# Patient Record
Sex: MALE | Race: BLACK OR AFRICAN AMERICAN | Employment: OTHER | URBAN - METROPOLITAN AREA
[De-identification: names, ages, dates, MRNs, and addresses within clinical notes are randomized per-mention and may not be internally consistent; named-entity substitution may affect disease eponyms.]

---

## 2022-04-27 ENCOUNTER — HOSPITAL ENCOUNTER (OUTPATIENT)
Age: 72
Setting detail: OUTPATIENT SURGERY
Discharge: HOME | End: 2022-04-27
Attending: INTERNAL MEDICINE | Admitting: INTERNAL MEDICINE
Payer: MEDICARE

## 2022-04-27 VITALS
HEIGHT: 74 IN | OXYGEN SATURATION: 94 % | RESPIRATION RATE: 16 BRPM | SYSTOLIC BLOOD PRESSURE: 161 MMHG | BODY MASS INDEX: 36.72 KG/M2 | DIASTOLIC BLOOD PRESSURE: 98 MMHG | HEART RATE: 71 BPM | WEIGHT: 286.1 LBS | TEMPERATURE: 97.8 F

## 2022-04-27 LAB — GLUCOSE BLD STRIP.AUTO-MCNC: 155 MG/DL (ref 70–110)

## 2022-04-27 PROCEDURE — 77030040361 HC SLV COMPR DVT MDII -B: Performed by: INTERNAL MEDICINE

## 2022-04-27 PROCEDURE — 77030039961 HC KT CUST COLON BSC -D: Performed by: INTERNAL MEDICINE

## 2022-04-27 PROCEDURE — 2709999900 HC NON-CHARGEABLE SUPPLY: Performed by: INTERNAL MEDICINE

## 2022-04-27 PROCEDURE — 82962 GLUCOSE BLOOD TEST: CPT

## 2022-04-27 PROCEDURE — 74011250636 HC RX REV CODE- 250/636: Performed by: INTERNAL MEDICINE

## 2022-04-27 PROCEDURE — 76040000019: Performed by: INTERNAL MEDICINE

## 2022-04-27 RX ORDER — EPINEPHRINE 0.1 MG/ML
1 INJECTION INTRACARDIAC; INTRAVENOUS
Status: CANCELLED | OUTPATIENT
Start: 2022-04-27 | End: 2022-04-28

## 2022-04-27 RX ORDER — SODIUM CHLORIDE 9 MG/ML
125 INJECTION, SOLUTION INTRAVENOUS CONTINUOUS
Status: DISCONTINUED | OUTPATIENT
Start: 2022-04-27 | End: 2022-04-27 | Stop reason: HOSPADM

## 2022-04-27 RX ORDER — METFORMIN HYDROCHLORIDE 500 MG/1
500 TABLET ORAL 2 TIMES DAILY
COMMUNITY

## 2022-04-27 RX ORDER — DIPHENHYDRAMINE HYDROCHLORIDE 50 MG/ML
50 INJECTION, SOLUTION INTRAMUSCULAR; INTRAVENOUS ONCE
Status: CANCELLED | OUTPATIENT
Start: 2022-04-27 | End: 2022-04-27

## 2022-04-27 RX ORDER — SODIUM CHLORIDE 9 MG/ML
1000 INJECTION, SOLUTION INTRAVENOUS CONTINUOUS
Status: CANCELLED | OUTPATIENT
Start: 2022-04-27 | End: 2022-04-27

## 2022-04-27 RX ORDER — AMLODIPINE BESYLATE 10 MG/1
10 TABLET ORAL DAILY
COMMUNITY

## 2022-04-27 RX ORDER — NALOXONE HYDROCHLORIDE 0.4 MG/ML
0.4 INJECTION, SOLUTION INTRAMUSCULAR; INTRAVENOUS; SUBCUTANEOUS
Status: CANCELLED | OUTPATIENT
Start: 2022-04-27 | End: 2022-04-27

## 2022-04-27 RX ORDER — DEXTROMETHORPHAN/PSEUDOEPHED 2.5-7.5/.8
1.2 DROPS ORAL
Status: CANCELLED | OUTPATIENT
Start: 2022-04-27

## 2022-04-27 RX ORDER — GLUCOSAMINE SULFATE 1500 MG
1000 POWDER IN PACKET (EA) ORAL DAILY
COMMUNITY

## 2022-04-27 RX ORDER — FENTANYL CITRATE 50 UG/ML
100 INJECTION, SOLUTION INTRAMUSCULAR; INTRAVENOUS
Status: DISCONTINUED | OUTPATIENT
Start: 2022-04-27 | End: 2022-04-27 | Stop reason: HOSPADM

## 2022-04-27 RX ORDER — SODIUM CHLORIDE 0.9 % (FLUSH) 0.9 %
5-40 SYRINGE (ML) INJECTION AS NEEDED
Status: CANCELLED | OUTPATIENT
Start: 2022-04-27

## 2022-04-27 RX ORDER — FLUMAZENIL 0.1 MG/ML
0.2 INJECTION INTRAVENOUS
Status: CANCELLED | OUTPATIENT
Start: 2022-04-27 | End: 2022-04-27

## 2022-04-27 RX ORDER — MIDAZOLAM HYDROCHLORIDE 1 MG/ML
.25-5 INJECTION, SOLUTION INTRAMUSCULAR; INTRAVENOUS
Status: DISCONTINUED | OUTPATIENT
Start: 2022-04-27 | End: 2022-04-27 | Stop reason: HOSPADM

## 2022-04-27 RX ORDER — CARVEDILOL 25 MG/1
25 TABLET ORAL 2 TIMES DAILY
COMMUNITY

## 2022-04-27 RX ORDER — ATROPINE SULFATE 0.1 MG/ML
0.5 INJECTION INTRAVENOUS
Status: CANCELLED | OUTPATIENT
Start: 2022-04-27 | End: 2022-04-28

## 2022-04-27 RX ORDER — SODIUM CHLORIDE 0.9 % (FLUSH) 0.9 %
5-40 SYRINGE (ML) INJECTION EVERY 8 HOURS
Status: CANCELLED | OUTPATIENT
Start: 2022-04-27

## 2022-04-27 RX ADMIN — SODIUM CHLORIDE 125 ML/HR: 900 INJECTION, SOLUTION INTRAVENOUS at 10:56

## 2022-04-27 NOTE — H&P
Assessment/Plan  # Detail Type Description    1. Assessment GERD without esophagitis (K21.9). Impression Pt of Dr. Madai Chawla [facility]), referred to GI by Dr. Venancio Loaiza (ENT), for evaluation and treatment of GERD.  _________________________________  *Onset: 4 months  *GI complaints: Intermittent diffuse throat pain/irritation (\"bothersome, but not burning\"), dry mouth w/foul-taste present, heartburn, mild hoarseness, epigastric pain, Difficulty w/eructation (has hard time releasing belches)  *Treatments: Treated with Augmentin and Omeprazole by ENT of 7/27/2021  *Flexible Endoscopy done by ENT in office: revealed no mucosal lesions except for some significant post-cricoid mucosal thickening.  _________________________________  *Taking Amlodipine (CCB) for HTN. *Taking PPI: Omeprazole 20mg (Since July 2021) QPM, sometimes too soon after dinner.  _________________________________  *FIT test through [facility]: Negative (Per Pt)  *Cologuard: Negative - 3 weeks ago (Per Pt)  *Never had an EGD done before.    -Old records unavailable at this time, any records recovered will be scanned to chart at later date.  _________________________________  BM: 1/day. Patient Plan *Overall Plan:  -Continue taking Omeprazole 20mg Daily, ensure proper dosage timing (Instructions provided). -Encouraged patient to lose weight, as obesity contributes to worsening GERD symptoms.  -Would suggest to PCP D/C Amlodipine, and stay away from CCB's for antihypertensive therapy, as this drug class is known for contributing to reflux symptoms d/t vasodilation and smooth muscle relaxation. *EGD planned.  _________________________________  *EGD Plan: Will proceed with EGD with Dr. Good Esquivel (@[facility] d/t: Advancing Age, comorbidities), to evaluate upper GI tract for abnormalities, evidence to explain symptoms, and Temple's epithelium with biopsies, polypectomy, or dilation as indicated.   -Patient is advised to take Thyroid meds, BP meds, beta blocker and any cardiac meds the AM of procedure with sip clear liquid, 4 hours prior to procedure start time. -Bring inhalers to procedure.  -Diabetic medication instructions (See Below). *EGD Risks:  Explained risks of procedure to include bleeding, infection, reaction to sedation, and perforation with possible need for admission to the hospital, and in the most extensive of  circumstances, the patient may require surgery. Pt verbalized understanding of these risks and is agreeable with this procedure.  _________________________________  *PPI Dosing Instructions: (Printed copy provided to patient)  -Instructed patient to take PPI first thing in the morning on an empty stomach with a full glass of water, 30 minutes before eating any food to get the full effect of the medication as intended. If taken QPM, ensure dose is either 30 minutes before food, or 4 hours after food. Use Tums, or Maalox for breakthrough reflux symptoms. *Dr. Orozco Clamp:   [facility] (On [address] [facility] [address])  [address] (On [address] from [place], may take up to two weeks to arrive)  _________________________________  *GERD \"Anti-Reflux Diet\"- Patient is advised to:   1. Eat smaller meals and maintain and lowfat, low carb diet  2. Change to decaffeinated beverages only  3. Not eat within 3-4 hours of bedtime  4. To elevate the torso while sleeping at night either by sleeping on a foam incline wedge or by elevating the head of the bed. 5. Avoid alcohol, NSAIDs, citrus and acidic foods as these things can contribute to acid reflux. (Enteric Coated Aspirin only - If prescribed)    Plan Orders Further diagnostic evaluations ordered today include(s) UPPER GI ENDOSCOPY, DIAGNOSIS to be performed. He will be scheduled for GASTROENTEROLOGY PROCEDURE, Next Lab Date is within 2 Weeks on 04/27/2022.  Clinical information/comments: at location [facility]. The surgeon scheduled is Nicole Wilson MD. An assistant has not been requested. 2. Assessment Early satiety (R68.81). Impression Progressively worsening. 3. Assessment Loss of appetite (R63.0). Impression Progressively worsening. 4. Assessment COPD (J44.9). Impression Pt states his COPD, makes him too high risk for colonoscopy procedures. Upon further assessment of this statement:   He states a few years ago, he had a Colon consultation at [facility], and upon ambulation he began wheezing and coughing and the Nurse determined that his condition did not meet safe-sedation standards to proceed with the C-scope. He has had mail-off FIT tests and Cologuard screenings since.  _____________________  *O2 Saturation breathing trial completed in office today to determine if pt needs pre-scope Pulmonology clearance, as both EGD and colonoscopy would pose the same endoscopic and sedation risks in terms of COPD severity. *O2 Sat. Breathing Trial:  -Sittin% on RA, HR: 72  -Standin% on RA, HR: 76  -Ambulatin% on RA, HR: 78  *Pt was able to ambulate the full length of the hallway without problems, no audible wheezing appreciated, no coughing. He was even saying goodbye to Staff, and laughing as he was ambulating, all while wearing mask over mouth (nose exposed). Patient Plan *Breathing condition stable, with no increased endoscopic or sedation risks identified at this time. _____________________  *No need to send pre-procedure clearance form to Pulmonologist for EGD clearance. Pt passed O2 Sat trial, no home O2, controlled w/Nebulizer treatments and inhalers only. On CPAP for ELISE. 5. Assessment Body mass index [BMI] 36.0-36.9, adult (Z68.36). Impression BMI: 36.46, Pt is obese w/a Very Tall Frame. These factors increase risks of procedural complications with sedation, and compromise airway maintenance. Special attention to airway management.     Patient Plan -Encouraged patient to lose weight, as obesity contributes to worsening GERD symptoms. This 80year old  patient was referred by ARABELLA Valdez. This 80year old male presents for GERD. History of Present Illness  1. GERD   The onset of the heartburn was 3 months ago. The severity is moderate-severe. The problem is improving. It occurs constantly. Context: treatment with PPIs. Denies aggravating factors. The symptoms are relieved by antacids, omeprazole and [place]. Associated symptoms include chronic cough, nausea, sore throat and weight loss. Pertinent negatives include aspiration, awakens w/ choking or heartburn, dental erosions, dysphagia, dyspnea, globus sensation, halitosis, hoarseness, melena, pneumonitis, post-nasal drainage, reflux, stridor, vomiting and weight gain. Additional information: Pt never had a EGD, Pt had Cologuard 3 week ago Negative report per pt, BM 1x daily          Problem List  Problem Description Onset Date Chronic Clinical Status Notes   Body mass index 30+ - obesity 04/14/2022 N     Gastroesophageal reflux in child 04/14/2022 N     Asthma-chronic obstructive pulmonary disease overlap syndrome 04/14/2022 N       Past Medical/Surgical History   (Detailed)  Disease/disorder Onset Date Management Date Comments   GERD       COPD       Hypertension       Sleep apnea             Family History   (Detailed)      Social History  (Detailed)  Tobacco use reviewed. Preferred language is [place]. Marital Status/Family/Social Support  Marital status:      Smoking status: Current every day smoker. Tobacco Screening  Patient has used tobacco. Patient has used tobacco in the last 30 days. Patient has not used smokeless tobacco in the last 30 days. Smoking Status  Type Smoking Status Usage Per Day Years Used Pack Years Total Pack Years    Current every day smoker         Alcohol  There is a history of alcohol use. Type: Wine. consumed weekly.     Caffeine  The patient uses caffeine: coffee - 1 cup a day. Medications (active prior to today)  Medication Instructions Start Date Stop Date Refilled Elsewhere   amlodipine 10 mg tablet take 1 tablet by oral route  every day //   Y   glipizide ER 2.5 mg tablet, extended release 24 hr take 2 tablet by oral route  every day with breakfast //   Y   metformin 500 mg tablet take 1 tablet by oral route 2 times every day with morning and evening meals //   Y   omeprazole 10 mg capsule,delayed release take 2 capsule by oral route  every day before a meal //   Y     Patient Status   Completed with information received for patient in a summary of care record. Medications (Added, Continued or Stopped today)  Start Date Medication Directions PRN Status PRN Reason Instruction Stop Date    amlodipine 10 mg tablet take 1 tablet by oral route  every day N       glipizide ER 2.5 mg tablet, extended release 24 hr take 2 tablet by oral route  every day with breakfast N       metformin 500 mg tablet take 1 tablet by oral route 2 times every day with morning and evening meals N       omeprazole 10 mg capsule,delayed release take 2 capsule by oral route  every day before a meal N          Review of Systems  System Neg/Pos Details   Constitutional Positive Weight loss. Constitutional Negative Fever and Weight gain. ENMT Positive Sore throat. ENMT Negative Dental erosions, Globus sensation, Halitosis, Hoarseness, Post-nasal drainage and Sinus Infection. Eyes Negative Double vision. Respiratory Positive Chronic cough. Respiratory Negative Aspiration, Asthma, Dyspnea, Pneumonitis and Stridor. Cardio Negative Chest pain, Edema and Irregular heartbeat/palpitations. GI Positive Nausea. GI Negative Abdominal pain, Awakenings with choking or heartburn, Change in bowel habits, Constipation, Decreased appetite, Diarrhea, Dysphagia, Heartburn, Hematemesis, Hematochezia, Melena, Reflux and Vomiting.  Negative Dysuria and Hematuria.    Endocrine Negative Cold intolerance and Heat intolerance. Neuro Negative Dizziness, Headache, Numbness and Tremors. Psych Negative Anxiety, Depression and Increased stress. Integumentary Negative Hives, Pruritus and Rash. MS Negative Back pain, Joint pain and Myalgia. Hema/Lymph Negative Easy bleeding, Easy bruising and Lymphadenopathy. Allergic/Immuno Negative Food allergies and Immunosuppression. Vital Signs   Height  Time ft in cm Last Measured Height Position   10:54 AM 6.0 2.00 187.96 04/14/2022 Standing     Weight/BSA/BMI  Time lb oz kg Context BMI kg/m2 BSA m2   10:54 .00  128.820 dressed with shoes 36.46 2.59     Date/Time Temp Pulse BP Arterial Line 1 BP (mmHg) BP Patient Position Resp SpO2 O2 Device O2 Flow Rate (L/min) Level of Consciousness MEWS Score Weight   04/27/22 1056 -- -- -- -- -- 14 -- -- -- -- -- --   04/27/22 1022 98 °F (36.7 °C) 71 158/89 Abnormal  -- -- -- 98 % None (Room air) -- Alert (0) -- 129.8 kg (286 lb 1.6 oz)       Physical  Exam  Exam Findings Details   Male GI Quick Exam Comments Obese w/Very Tall Frame. Ambulates with single-point cane held in right hand. Constitutional Normal Well developed. Eyes Normal Conjunctiva - Right: Normal, Left: Normal. Sclera - Right: Normal, Left: Normal.   Nasopharynx Normal Lips/teeth/gums - Normal.   Neck Exam Normal Inspection - Normal.   Respiratory Normal Inspection - Normal.   Cardiovascular Normal Rhythm - Regular. Extra sounds - None. Murmurs - None. Vascular Normal Pulses - Brachial: Normal.   Abdomen * Obese. Skin Normal Inspection - Normal.   Musculoskeletal Normal Hands/Wrist - Right: Normal, Left: Normal.   Extremity Normal No edema. Neurological Normal Fine motor skills - Normal.   Psychiatric Normal Orientation - Oriented to time, place, person & situation. Appropriate mood and affect. Patient Education  # Patient Education   1.  Gastroesophageal Reflux Disease (GERD): Care Instructions         Active Patient Care Team Members  Name Contact Agency Type Support Role Relationship Active Date Inactive Date Specialty   Jus Lynn   Patient provider PCP      Marjan Hodge   Emergency Contact Spouse      Jessica Amaro   encounter provider    Gastroenterology     No change in H&P

## 2022-04-27 NOTE — DISCHARGE INSTRUCTIONS
Hope Brannon  [id number]  1950    EGD DISCHARGE INSTRUCTIONS  Discomfort:  Sore throat- throat lozenges or warm salt water gargle  redness at IV site- apply warm compress to area; if redness or soreness persist- contact your physician  Gaseous discomfort- walking, belching will help relieve any discomfort  You may not operate a vehicle until the next day  You may not engage in an occupation involving machinery or appliances until the next day  You may not drink alcoholic beverages until the next day  Avoid making any critical decisions for at least 24 hour    DIET   You may not resume your regular diet. Antireflux diet. ACTIVITY  You may not resume your normal daily activities   Spend the remainder of the day resting -  avoid any strenuous activity. CALL M.D. ANY SIGN OF   Increasing pain, nausea, vomiting  Abdominal distension (swelling)  New increased bleeding (oral or rectal)  Fever (chills)  Pain in chest area  Bloody discharge from nose or mouth  Shortness of breath     You may not take any Advil, Aspirin, Ibuprofen, Motrin, Aleve, or Goodys  ONLY  Tylenol as needed for pain. Follow-up Instructions: Follow-up in the office as scheduled or make a follow-up appointment in 2 weeks. Lan Anderson MD  April 27, 2022        DISCHARGE SUMMARY from Nurse    PATIENT INSTRUCTIONS:    After general anesthesia or intravenous sedation, for 24 hours or while taking prescription Narcotics:  · Limit your activities  · Do not drive and operate hazardous machinery  · Do not make important personal or business decisions  · Do  not drink alcoholic beverages  · If you have not urinated within 8 hours after discharge, please contact your surgeon on call.     Report the following to your surgeon:  · Excessive pain, swelling, redness or odor of or around the surgical area  · Temperature over 100.5  · Nausea and vomiting lasting longer than 4 hours or if unable to take medications  · Any signs of decreased circulation or nerve impairment to extremity: change in color, persistent  numbness, tingling, coldness or increase pain  · Any questions    What to do at Home:  Recommended activity: Activity as tolerated and no driving for today. If you experience any of the following symptoms as above, please follow up with Dr. Celine Phelps. *  Please give a list of your current medications to your Primary Care Provider. *  Please update this list whenever your medications are discontinued, doses are      changed, or new medications (including over-the-counter products) are added. *  Please carry medication information at all times in case of emergency situations. These are general instructions for a healthy lifestyle:    No smoking/ No tobacco products/ Avoid exposure to second hand smoke  Surgeon General's Warning:  Quitting smoking now greatly reduces serious risk to your health. Obesity, smoking, and sedentary lifestyle greatly increases your risk for illness    A healthy diet, regular physical exercise & weight monitoring are important for maintaining a healthy lifestyle    You may be retaining fluid if you have a history of heart failure or if you experience any of the following symptoms:  Weight gain of 3 pounds or more overnight or 5 pounds in a week, increased swelling in our hands or feet or shortness of breath while lying flat in bed. Please call your doctor as soon as you notice any of these symptoms; do not wait until your next office visit. The discharge information has been reviewed with the patient and granddaughter. The patient and granddaughter verbalized understanding. Discharge medications reviewed with the patient and granddaughter and appropriate educational materials and side effects teaching were provided.   ___________________________________________________________________________________________________________________________________    Patient armband removed and shredded

## 2022-04-27 NOTE — PROCEDURES
(EGD) Esophagogastroduodenoscopy (UPPER ENDOSCOPY) Procedure Note  [facility] [place]  __________________________________________________________________________________________________________________________      4/27/2022     Patient: Anitra Born YOB: 1950 Gender: male Age: 80 y.o. INDICATION:  Pt of Dr. Suzanne Mohan [facility]), referred to GI by Dr. Lorene Forrest (ENT), for evaluation and treatment of GERD. Onset: >4 months. Daily   *GI complaints: Intermittent diffuse throat pain/irritation (\"bothersome, but not burning\"), dry mouth w/foul-taste present, heartburn, mild hoarseness, epigastric pain, Difficulty w/eructation (has hard time releasing belches)  *Treatments: Treated with Augmentin and Omeprazole by ENT of 7/27/2021  *Flexible Endoscopy done by ENT in office: revealed no mucosal lesions except for some significant post-cricoid mucosal thickening. He has been c/o since the same time of RLQ pain relived by bm  Taking Amlodipine (CCB) for HTN. Taking PPI: Omeprazole 20mg (Since July 2021) QPM, sometimes too soon after dinner. FIT test through [facility]: Negative (Per Pt)  Cologuard: Negative - 3 weeks ago (Per Pt)  *Never had an EGD done before. BM: 1/day. He denied taking NSAID's he smokes 4 cigarets/ day and drinks 8 oz of wine daily      : Lashell Perez MD    Referring Provider:  Emily Moran NP    Sedation:  Versed 6 mg IV, Fentanyl 100 mcg IV. Procedure Details:  After infomed consent was obtained for the procedure, with all risks and benefits of procedure explained to the patient. He was taken to the endoscopy suite and placed in the left lateral decubitus position. Following sequential administration of sedation as per above, the endoscope was inserted into the mouth and advanced under direct vision to third portion of the duodenum.   A careful inspection was made as the gastroscope was withdrawn, including a retroflexed view of the proximal stomach; findings and interventions are described below. EGD:  OROPHARYNX: The vocal Cords and the larynx are normal.   ESOPHAGUS: The proximal, mid, and distal oesophagus are normal. The Z-Line is slightly irregular. 2.5 cm Hiatal hernia. Diaphragmatic opening or notch is located at 50 cm. STOMACH: No evidence of blood, fluid or solid food retention. The fundus on antegrade and retroflex views is normal. The cardia, body, lesser curvature, greater curvature, the antrum, and pylorus are normal. The gastric mucosa is normal.  DUODENUM: The bulb, second, third portions and major papilla are normal.  PROXIMAL JEJUNUM:  Not examined. Therapies:  Nil    Specimen: none           Complications:   None    EBL:  Nil. IMPLANTS: * No implants in log *  IMPRESSION: The Z-Line is slightly irregular. 2.5 cm Hiatal hernia. Diaphragmatic opening or notch is located at 50 cm . Otherwise normal EGD. RECOMMENDATION:  May resume antireflux diet. Avoid NSAID's. Make a FU appointment at the office. continue taking the Omeprazole 20 mg daily but it is important to take half an hour before the first meal of the day to get the maximum effect. Need to avoid eating for > 3 hours before reclining. May have to use a wedge under the mattress to keep the stomach elevated and avoid reflux.     Assistant: None    --Jyothi Jones MD on 4/27/2022 at 12:30 PM

## (undated) DEVICE — REM POLYHESIVE ADULT PATIENT RETURN ELECTRODE: Brand: VALLEYLAB

## (undated) DEVICE — SINGLE PORT MANIFOLD: Brand: NEPTUNE 2

## (undated) DEVICE — Device

## (undated) DEVICE — SPONGE GZ W4XL4IN COT 12 PLY TYP VII WVN C FLD DSGN

## (undated) DEVICE — MOUTHPIECE ENDOSCP 20X27MM --

## (undated) DEVICE — GARMENT,MEDLINE,DVT,INT,CALF,MED, GEN2: Brand: MEDLINE

## (undated) DEVICE — TRAP SPEC COLL POLYP POLYSTYR --

## (undated) DEVICE — TUBING, SUCTION, 1/4" X 12', STRAIGHT: Brand: MEDLINE

## (undated) DEVICE — KENDALL RADIOLUCENT FOAM MONITORING ELECTRODE RECTANGULAR SHAPE: Brand: KENDALL

## (undated) DEVICE — MAJ-1414 SINGLE USE ADPATER BIOPSY VALV: Brand: SINGLE USE ADAPTOR BIOPSY VALVE